# Patient Record
Sex: MALE | Race: WHITE | NOT HISPANIC OR LATINO | Employment: UNEMPLOYED | URBAN - METROPOLITAN AREA
[De-identification: names, ages, dates, MRNs, and addresses within clinical notes are randomized per-mention and may not be internally consistent; named-entity substitution may affect disease eponyms.]

---
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
## 2024-03-27 ENCOUNTER — OFFICE VISIT (OUTPATIENT)
Dept: FAMILY MEDICINE CLINIC | Facility: MEDICAL CENTER | Age: 19
End: 2024-03-27
Payer: COMMERCIAL

## 2024-03-27 VITALS
HEIGHT: 74 IN | BODY MASS INDEX: 20.56 KG/M2 | WEIGHT: 160.2 LBS | SYSTOLIC BLOOD PRESSURE: 122 MMHG | DIASTOLIC BLOOD PRESSURE: 76 MMHG | TEMPERATURE: 99.4 F | HEART RATE: 82 BPM | OXYGEN SATURATION: 97 %

## 2024-03-27 DIAGNOSIS — Z13.220 LIPID SCREENING: ICD-10-CM

## 2024-03-27 DIAGNOSIS — Z23 ENCOUNTER FOR IMMUNIZATION: ICD-10-CM

## 2024-03-27 DIAGNOSIS — B35.3 TINEA PEDIS OF BOTH FEET: ICD-10-CM

## 2024-03-27 DIAGNOSIS — Z00.00 ANNUAL PHYSICAL EXAM: Primary | ICD-10-CM

## 2024-03-27 DIAGNOSIS — H61.23 EXCESSIVE CERUMEN IN BOTH EAR CANALS: ICD-10-CM

## 2024-03-27 PROBLEM — M25.372 ANKLE INSTABILITY, LEFT: Status: RESOLVED | Noted: 2022-03-11 | Resolved: 2024-03-27

## 2024-03-27 PROBLEM — T14.8XXA CONTUSION OF BONE: Status: RESOLVED | Noted: 2022-03-08 | Resolved: 2024-03-27

## 2024-03-27 PROBLEM — S93.492A SPRAIN OF ANTERIOR TALOFIBULAR LIGAMENT OF LEFT ANKLE: Status: RESOLVED | Noted: 2022-03-08 | Resolved: 2024-03-27

## 2024-03-27 PROBLEM — S93.422A SPRAIN OF DELTOID LIGAMENT OF LEFT ANKLE: Status: RESOLVED | Noted: 2022-03-08 | Resolved: 2024-03-27

## 2024-03-27 PROBLEM — S96.912D ANKLE STRAIN, LEFT, SUBSEQUENT ENCOUNTER: Status: RESOLVED | Noted: 2022-03-11 | Resolved: 2024-03-27

## 2024-03-27 PROBLEM — S93.402A SEVERE SPRAIN OF LEFT ANKLE: Status: RESOLVED | Noted: 2022-03-11 | Resolved: 2024-03-27

## 2024-03-27 PROBLEM — S93.402A MODERATE LEFT ANKLE SPRAIN: Status: RESOLVED | Noted: 2022-03-08 | Resolved: 2024-03-27

## 2024-03-27 PROCEDURE — 90472 IMMUNIZATION ADMIN EACH ADD: CPT

## 2024-03-27 PROCEDURE — 99213 OFFICE O/P EST LOW 20 MIN: CPT

## 2024-03-27 PROCEDURE — 90471 IMMUNIZATION ADMIN: CPT

## 2024-03-27 PROCEDURE — 90651 9VHPV VACCINE 2/3 DOSE IM: CPT

## 2024-03-27 PROCEDURE — 99395 PREV VISIT EST AGE 18-39: CPT

## 2024-03-27 PROCEDURE — 90715 TDAP VACCINE 7 YRS/> IM: CPT

## 2024-03-27 RX ORDER — OFLOXACIN 3 MG/ML
1 SOLUTION/ DROPS OPHTHALMIC 4 TIMES DAILY
COMMUNITY
Start: 2024-03-22

## 2024-03-27 NOTE — PROGRESS NOTES
ADULT ANNUAL PHYSICAL  [facility] [place]    NAME: Stewart Mar  AGE: 18 y.o. SEX: male  : 2005     DATE: 3/27/2024     Assessment and Plan:   Advised to start Lotrimin cream OTC to bilateral feet for athlete's foot, use x 2 to 4 weeks twice daily.  Also advised to keep feet clean and dry, open to air when possible.  Advised to call if symptoms worsen or fail to improve.  Advised about use of Debrox eardrops OTC for  ear cerumen, avoid use of Q-tips.  Tdap vaccine administered today.  HPV #1 vaccine administered today.  Patient will return with nurse for HPV #2 and #3 vaccines.  Return in 1 year for annual physical, sooner if needed.  Fasting lab orders placed, to be done earliest convenience.  Problem List Items Addressed This Visit    None  Visit Diagnoses     Annual physical exam    -  Primary    Relevant Orders    Comprehensive metabolic panel    Encounter for immunization        Relevant Orders    TDAP VACCINE GREATER THAN OR EQUAL TO 8YO IM    HPV VACCINE 9 VALENT IM    Lipid screening        Relevant Orders    Lipid panel    Tinea pedis of both feet        Excessive cerumen in both ear canals              Immunizations and preventive care screenings were discussed with patient today. Appropriate education was printed on patient's after visit summary.    Counseling:  Alcohol/drug use: discussed moderation in alcohol intake, the recommendations for healthy alcohol use, and avoidance of illicit drug use.  Dental Health: discussed importance of regular tooth brushing, flossing, and dental visits.  Exercise: the importance of regular exercise/physical activity was discussed. Recommend exercise 3-5 times per week for at least 30 minutes.       Depression Screening and Follow-up Plan: Patient was screened for depression during today's encounter. They screened negative with a PHQ-9 score of 0.        Return in 1 year (on 3/27/2025).     Chief  Complaint:     Chief Complaint   Patient presents with   • Physical Exam      History of Present Illness:     Adult Annual Physical   Patient here for a comprehensive physical exam. The patient reports red areas on b/l feet specifically on the toes x 2 months, was itchy but is no longer itchy. Denies pain, drainage, peeling skin. He tried Tinactin and Lamisil which gave him some relief but did not clear it. He is still using it once per day.       Diet and Physical Activity  Diet/Nutrition: well balanced diet.   Exercise: walking and lifting weights .      Depression Screening  PHQ-2/9 Depression Screening    Little interest or pleasure in doing things: 0 - not at all  Feeling down, depressed, or hopeless: 0 - not at all  Trouble falling or staying asleep, or sleeping too much: 0 - not at all  Feeling tired or having little energy: 0 - not at all  Poor appetite or overeatin - not at all  Feeling bad about yourself - or that you are a failure or have let yourself or your family down: 0 - not at all  Trouble concentrating on things, such as reading the newspaper or watching television: 0 - not at all  Moving or speaking so slowly that other people could have noticed. Or the opposite - being so fidgety or restless that you have been moving around a lot more than usual: 0 - not at all  Thoughts that you would be better off dead, or of hurting yourself in some way: 0 - not at all  PHQ-9 Score: 0  PHQ-9 Interpretation: No or Minimal depression       General Health  Sleep: sleeps well.   Hearing: normal - bilateral.  Vision: no vision problems.   Dental: regular dental visits.        Health  History of STDs?: no.       Review of Systems:     Review of Systems   Constitutional: Negative.    HENT: Negative.     Eyes: Negative.    Respiratory: Negative.     Cardiovascular: Negative.    Gastrointestinal: Negative.    Endocrine: Negative.    Genitourinary: Negative.    Musculoskeletal: Negative.    Skin:         B/l foot  redness and itchiness   Allergic/Immunologic: Negative.    Neurological: Negative.    Hematological: Negative.    Psychiatric/Behavioral: Negative.        Past Medical History:     Past Medical History:   Diagnosis Date   • Ankle instability, left 03/11/2022   • Ankle strain, left, subsequent encounter 03/11/2022   • Anxiety    • Contusion of bone 03/08/2022   • Irritable bowel syndrome with diarrhea     Last assessed 1/7/2015   • Moderate left ankle sprain 03/08/2022   • Severe sprain of left ankle 03/11/2022   • Sprain of anterior talofibular ligament of left ankle 03/08/2022   • Sprain of deltoid ligament of left ankle 03/08/2022      Past Surgical History:     Past Surgical History:   Procedure Laterality Date   • NO PAST SURGERIES        Social History:     Social History     Socioeconomic History   • Marital status: Single     Spouse name: None   • Number of children: None   • Years of education: None   • Highest education level: None   Occupational History   • None   Tobacco Use   • Smoking status: Never   • Smokeless tobacco: Never   Vaping Use   • Vaping status: Never Used   Substance and Sexual Activity   • Alcohol use: No   • Drug use: Never   • Sexual activity: None   Other Topics Concern   • None   Social History Narrative   • None     Social Determinants of Health     Financial Resource Strain: Not on file   Food Insecurity: Not on file   Transportation Needs: Not on file   Physical Activity: Not on file   Stress: Not on file   Social Connections: Not on file   Intimate Partner Violence: Not on file   Housing Stability: Not on file      Family History:     Family History   Problem Relation Age of Onset   • Hypertension Father    • Diabetes Family       Current Medications:     Current Outpatient Medications   Medication Sig Dispense Refill   • fluticasone (FLONASE) 50 mcg/act nasal spray 2 sprays into each nostril daily 1 Bottle 3   • Multiple Vitamin (MULTIVITAMIN) tablet Take 1 tablet by mouth daily   "   • ofloxacin (OCUFLOX) 0.3 % ophthalmic solution Administer 1 drop into the left eye 4 (four) times a day       No current facility-administered medications for this visit.      Allergies:     Allergies   Allergen Reactions   • Bactrim [Sulfamethoxazole-Trimethoprim] Hives   • Azithromycin      Stomach aches   • Penicillins Hives      Physical Exam:     /76 (BP Location: Left arm, Patient Position: Sitting, Cuff Size: Standard)   Pulse 82   Temp 99.4 °F (37.4 °C) (Temporal)   Ht 6' 2\" (1.88 m)   Wt 72.7 kg (160 lb 3.2 oz)   SpO2 97%   BMI 20.57 kg/m²     Physical Exam  Vitals and nursing note reviewed.   Constitutional:       General: He is not in acute distress.     Appearance: Normal appearance. He is not ill-appearing.   HENT:      Head: Normocephalic and atraumatic.      Right Ear: There is impacted cerumen.      Left Ear: There is impacted cerumen.      Nose: Nose normal.      Mouth/Throat:      Mouth: Mucous membranes are moist.      Pharynx: Oropharynx is clear.   Eyes:      General:         Right eye: No discharge.         Left eye: No discharge.      Conjunctiva/sclera: Conjunctivae normal.      Pupils: Pupils are equal, round, and reactive to light.   Cardiovascular:      Rate and Rhythm: Normal rate and regular rhythm.      Pulses: Normal pulses.      Heart sounds: Normal heart sounds.   Pulmonary:      Effort: Pulmonary effort is normal.      Breath sounds: Normal breath sounds.   Abdominal:      General: Abdomen is flat. Bowel sounds are normal.      Palpations: Abdomen is soft.      Tenderness: There is no abdominal tenderness.   Musculoskeletal:         General: Normal range of motion.      Cervical back: Normal range of motion and neck supple.   Feet:      Right foot:      Skin integrity: Skin integrity normal.      Toenail Condition: Right toenails are normal.      Left foot:      Skin integrity: Skin integrity normal.      Toenail Condition: Left toenails are normal.      Comments: " Small red blisters noted to b/l toes. No active drainage, surrounding redness. No blisters on feet.   Skin:     General: Skin is warm and dry.   Neurological:      General: No focal deficit present.      Mental Status: He is alert and oriented to person, place, and time. Mental status is at baseline.   Psychiatric:         Mood and Affect: Mood normal.         Behavior: Behavior normal.         Thought Content: Thought content normal.          ABIMBOLA Rollins   [facility]

## 2024-11-19 NOTE — PROGRESS NOTES
Ear cerumen removal    Date/Time: 2024 3:40 PM    Performed by: Francoise London PA-C  Authorized by: Francoise London PA-C  Universal Protocol:  procedure performed by consultantConsent: Verbal consent obtained.  Consent given by: patient  Patient understanding: patient states understanding of the procedure being performed  Patient consent: the patient's understanding of the procedure matches consent given    Patient location:  Bedside  Procedure details:     Location:  R ear    Procedure type: irrigation with instrumentation      Instrumentation: curette      Approach:  External  Post-procedure details:     Complication:  None    Hearing quality:  Improved    Patient tolerance of procedure:  Tolerated well, no immediate complications    Name: Stewart Mar      : 2005      MRN: [id number]  Encounter Provider: Francoise London PA-C  Encounter Date: 2024   Encounter department: [facility] [address] [place] [address]  :  Assessment & Plan  Encounter to establish care  -Est care   -Concerns include anxiety/depression and feeling right ear clogged   -Placed routine labs     Orders:    CBC and differential; Future    Comprehensive metabolic panel; Future    Encounter for immunization  -Due for second dose of HPV vaccine    Orders:    HPV VACCINE 9 VALENT IM    Anxiety and depression  -He states he has had anxiety since he was very young. He notes social settings tend to peak anxiety   -No active suicide ideations  -He has seen a therapist in past and it was somewhat helpful   -Ordered Lexapro 5 mg daily   -Placed referral to psych services and gave handout for behavioral services   -F/U in one month     Depression Screening Follow-up Plan: Patient's depression screening was positive with a PHQ-9 score of 8. Patient with underlying depression and was advised to continue current medications as prescribed. Patient assessed for underlying major depression. They have no active suicidal  ideations. Brief counseling provided and recommend additional follow-up/re-evaluation next office visit.  -  Orders:    Ambulatory referral to Psych Services; Future    escitalopram (LEXAPRO) 5 mg tablet; Take 1 tablet (5 mg total) by mouth daily    Need for hepatitis C screening test    Orders:    Hepatitis C Antibody; Future    Screening for HIV (human immunodeficiency virus)    Orders:    HIV 1/2 AG/AB w Reflex [facility] for 2 yr old and above; Future    Encounter for lipid screening for cardiovascular disease    Orders:    Lipid panel; Future    Impacted cerumen of right ear    Orders:    Ear cerumen removal          Depression Screening and Follow-up Plan: Patient with underlying depression and was advised to continue current medications as prescribed.       History of Present Illness     HPI    Stewart presents to office to establish care and discuss ear pain and anxiety. His right ear has been feeling clogged. His right TM perf in past and healed. It has been feeling clogged for at least 6 months.     Review of Systems   Constitutional:  Negative for chills, fatigue and fever.   HENT:  Positive for hearing loss. Negative for congestion, ear pain, rhinorrhea, sinus pain and sore throat.    Eyes:  Negative for pain.   Respiratory:  Negative for cough and shortness of breath.    Cardiovascular:  Negative for chest pain and leg swelling.   Gastrointestinal:  Negative for abdominal pain, constipation, diarrhea, nausea and vomiting.   Genitourinary:  Negative for difficulty urinating, dysuria, frequency and urgency.   Musculoskeletal:  Negative for neck pain.   Skin:  Negative for rash.   Neurological:  Negative for dizziness and headaches.   Psychiatric/Behavioral:  Negative for sleep disturbance.      Past Medical History   Past Medical History:   Diagnosis Date    Ankle instability, left 03/11/2022    Ankle strain, left, subsequent encounter 03/11/2022    Anxiety     Contusion of bone 03/08/2022    Irritable bowel  "syndrome with diarrhea     Last assessed 1/7/2015    Moderate left ankle sprain 03/08/2022    Severe sprain of left ankle 03/11/2022    Sprain of anterior talofibular ligament of left ankle 03/08/2022    Sprain of deltoid ligament of left ankle 03/08/2022     Past Surgical History:   Procedure Laterality Date    NO PAST SURGERIES       Family History   Problem Relation Age of Onset    Hypertension Father     Depression Father     Asthma Father     Anxiety disorder Father     Diabetes Family     Dementia Paternal Grandfather       reports that he has never smoked. He has never used smokeless tobacco. He reports that he does not drink alcohol and does not use drugs.  Current Outpatient Medications on File Prior to Visit   Medication Sig Dispense Refill    fluticasone (FLONASE) 50 mcg/act nasal spray 2 sprays into each nostril daily 1 Bottle 3    Multiple Vitamin (MULTIVITAMIN) tablet Take 1 tablet by mouth daily       No current facility-administered medications on file prior to visit.     Allergies   Allergen Reactions    Bactrim [Sulfamethoxazole-Trimethoprim] Hives    Azithromycin      Stomach aches    Penicillins Hives           Objective   /62   Pulse 86   Temp 99.7 °F (37.6 °C)   Ht 6' 2\" (1.88 m)   Wt 74.8 kg (165 lb)   SpO2 99%   BMI 21.18 kg/m²      Physical Exam  Vitals reviewed.   Constitutional:       General: He is not in acute distress.     Appearance: Normal appearance.   HENT:      Head: Normocephalic and atraumatic.      Right Ear: External ear normal. There is impacted cerumen.      Left Ear: Tympanic membrane, ear canal and external ear normal.      Nose: Nose normal.      Mouth/Throat:      Mouth: Mucous membranes are moist.   Eyes:      Extraocular Movements: Extraocular movements intact.      Conjunctiva/sclera: Conjunctivae normal.      Pupils: Pupils are equal, round, and reactive to light.   Cardiovascular:      Rate and Rhythm: Normal rate and regular rhythm.      Heart sounds: " Normal heart sounds.   Pulmonary:      Effort: Pulmonary effort is normal.      Breath sounds: Normal breath sounds. No wheezing, rhonchi or rales.   Abdominal:      General: Bowel sounds are normal. There is no distension.      Palpations: Abdomen is soft.      Tenderness: There is no abdominal tenderness.   Musculoskeletal:      Cervical back: Neck supple.      Right lower leg: No edema.      Left lower leg: No edema.   Lymphadenopathy:      Cervical: No cervical adenopathy.   Skin:     General: Skin is warm.      Capillary Refill: Capillary refill takes less than 2 seconds.      Findings: No rash.   Neurological:      Mental Status: He is alert. Mental status is at baseline.           Francoise London PA-C  [facility]  11/21/2024 8:40 AM

## 2024-11-20 ENCOUNTER — OFFICE VISIT (OUTPATIENT)
Dept: FAMILY MEDICINE CLINIC | Facility: CLINIC | Age: 19
End: 2024-11-20
Payer: COMMERCIAL

## 2024-11-20 ENCOUNTER — TELEPHONE (OUTPATIENT)
Age: 19
End: 2024-11-20

## 2024-11-20 VITALS
SYSTOLIC BLOOD PRESSURE: 122 MMHG | TEMPERATURE: 99.7 F | DIASTOLIC BLOOD PRESSURE: 62 MMHG | HEIGHT: 74 IN | OXYGEN SATURATION: 99 % | WEIGHT: 165 LBS | BODY MASS INDEX: 21.17 KG/M2 | HEART RATE: 86 BPM

## 2024-11-20 DIAGNOSIS — Z23 ENCOUNTER FOR IMMUNIZATION: ICD-10-CM

## 2024-11-20 DIAGNOSIS — Z76.89 ENCOUNTER TO ESTABLISH CARE: Primary | ICD-10-CM

## 2024-11-20 DIAGNOSIS — Z13.6 ENCOUNTER FOR LIPID SCREENING FOR CARDIOVASCULAR DISEASE: ICD-10-CM

## 2024-11-20 DIAGNOSIS — F41.9 ANXIETY AND DEPRESSION: ICD-10-CM

## 2024-11-20 DIAGNOSIS — Z11.4 SCREENING FOR HIV (HUMAN IMMUNODEFICIENCY VIRUS): ICD-10-CM

## 2024-11-20 DIAGNOSIS — Z11.59 NEED FOR HEPATITIS C SCREENING TEST: ICD-10-CM

## 2024-11-20 DIAGNOSIS — H61.21 IMPACTED CERUMEN OF RIGHT EAR: ICD-10-CM

## 2024-11-20 DIAGNOSIS — F32.A ANXIETY AND DEPRESSION: ICD-10-CM

## 2024-11-20 DIAGNOSIS — Z13.220 ENCOUNTER FOR LIPID SCREENING FOR CARDIOVASCULAR DISEASE: ICD-10-CM

## 2024-11-20 PROCEDURE — 69210 REMOVE IMPACTED EAR WAX UNI: CPT

## 2024-11-20 PROCEDURE — 90651 9VHPV VACCINE 2/3 DOSE IM: CPT

## 2024-11-20 PROCEDURE — 90471 IMMUNIZATION ADMIN: CPT

## 2024-11-20 PROCEDURE — 99214 OFFICE O/P EST MOD 30 MIN: CPT

## 2024-11-20 RX ORDER — ESCITALOPRAM OXALATE 5 MG/1
5 TABLET ORAL DAILY
Qty: 30 TABLET | Refills: 5 | Status: SHIPPED | OUTPATIENT
Start: 2024-11-20

## 2024-11-20 NOTE — TELEPHONE ENCOUNTER
Contacted patient in regards to Routine Referral in attempts to verify patient's needs of services and add patient to proper wait list. LVM for patient to contact intake dept  in regards to routine referral at [phone].  1st attempt.

## 2024-11-25 NOTE — TELEPHONE ENCOUNTER
Called Pt regarding a routine referral, in an attempt to verify services needed/interested, and advise of current wait list.  No answer, lvm for patient to call back at [phone].    2nd attempt

## 2024-12-20 ENCOUNTER — TELEPHONE (OUTPATIENT)
Age: 19
End: 2024-12-20

## 2024-12-20 NOTE — PROGRESS NOTES
Name: Stewart Mar      : 2005      MRN: [id number]  Encounter Provider: Francoise London PA-C  Encounter Date: 2024   Encounter department: [facility] [address]  :  Assessment & Plan  Mild depression  Depression Screening Follow-up Plan: Patient's depression screening was positive with a PHQ-9 score of 5. Patient assessed for underlying major depression. They have no active suicidal ideations. Brief counseling provided and recommend additional follow-up/re-evaluation next office visit. Feeling good on Lexapro. Waiting to hear from talk therapy.     -Tolerating Lexapro well   -Cont taking Lexapro 5 mg daily  -Waiting to hear from talk therapy  -Overall, anxiety and depression is improving since starting Lexapro   -       Eczema, unspecified type  -eczematous rash on b/l froearms  -PT reports he gets this every Winter when the temperature drops  -He usually uses otc hydrocortisone for symptoms. He is going to pick some up and start today  -Discussed if symptoms aren't resolved, options for stronger topical corticosteroids are available             Depression Screening and Follow-up Plan: Patient's depression screening was positive with a PHQ-9 score of 5. Patient with underlying depression and was advised to continue current medications as prescribed. Feeling good and tolerating lexapro well. Waiting to hear from talk therapy. F/U in 2 months      History of Present Illness     HPI    Stewart presents to the office for mental health follow up. He was started on lexapro 5 mg one month ago. He notes it is helping. He notes the end of the semester was stressful but he managed okay.       Review of Systems   Constitutional:  Negative for chills, fatigue and fever.   HENT:  Negative for congestion, ear pain, rhinorrhea, sinus pain and sore throat.    Eyes:  Negative for pain.   Respiratory:  Negative for cough and shortness of breath.    Cardiovascular:  Negative for  chest pain and leg swelling.   Gastrointestinal:  Negative for abdominal pain, constipation, diarrhea, nausea and vomiting.   Genitourinary:  Negative for difficulty urinating, dysuria, frequency and urgency.   Musculoskeletal:  Negative for neck pain.   Skin:  Negative for rash.   Neurological:  Negative for dizziness and headaches.   Psychiatric/Behavioral:  Negative for sleep disturbance.        Objective   There were no vitals taken for this visit.     Physical Exam  Vitals reviewed.   Constitutional:       General: He is not in acute distress.     Appearance: Normal appearance.   HENT:      Head: Normocephalic and atraumatic.      Right Ear: External ear normal.      Left Ear: External ear normal.      Nose: Nose normal.      Mouth/Throat:      Mouth: Mucous membranes are moist.   Eyes:      Extraocular Movements: Extraocular movements intact.      Conjunctiva/sclera: Conjunctivae normal.   Cardiovascular:      Rate and Rhythm: Normal rate and regular rhythm.      Heart sounds: Normal heart sounds.   Pulmonary:      Effort: Pulmonary effort is normal.      Breath sounds: Normal breath sounds. No wheezing, rhonchi or rales.   Abdominal:      General: Bowel sounds are normal. There is no distension.      Palpations: Abdomen is soft.      Tenderness: There is no abdominal tenderness.   Musculoskeletal:      Cervical back: Neck supple.      Right lower leg: No edema.      Left lower leg: No edema.   Lymphadenopathy:      Cervical: No cervical adenopathy.   Skin:     General: Skin is warm.      Capillary Refill: Capillary refill takes less than 2 seconds.      Findings: Rash present.      Comments: Eczematous rash on b/l forearm    Neurological:      Mental Status: He is alert. Mental status is at baseline.           Francoise London PA-C  [facility]  12/23/2024 2:13 PM

## 2024-12-20 NOTE — TELEPHONE ENCOUNTER
Patient was going to get labs done, but they noticed that they were linked to [facility]. Patients insurance only covers [facility], so patients mom wanted to know if new lab orders can be put in, so he can get them done @ [facility]. Please advise.      Lv Chu (Mother)  [phone] (Home Phone)

## 2024-12-23 ENCOUNTER — OFFICE VISIT (OUTPATIENT)
Dept: FAMILY MEDICINE CLINIC | Facility: CLINIC | Age: 19
End: 2024-12-23
Payer: COMMERCIAL

## 2024-12-23 VITALS
DIASTOLIC BLOOD PRESSURE: 70 MMHG | BODY MASS INDEX: 20.79 KG/M2 | WEIGHT: 162 LBS | OXYGEN SATURATION: 97 % | HEIGHT: 74 IN | TEMPERATURE: 98.5 F | HEART RATE: 93 BPM | SYSTOLIC BLOOD PRESSURE: 116 MMHG

## 2024-12-23 DIAGNOSIS — F32.A MILD DEPRESSION: Primary | ICD-10-CM

## 2024-12-23 DIAGNOSIS — L30.9 ECZEMA, UNSPECIFIED TYPE: ICD-10-CM

## 2024-12-23 PROCEDURE — 99214 OFFICE O/P EST MOD 30 MIN: CPT

## 2024-12-23 NOTE — ASSESSMENT & PLAN NOTE
Depression Screening Follow-up Plan: Patient's depression screening was positive with a PHQ-9 score of 5. Patient assessed for underlying major depression. They have no active suicidal ideations. Brief counseling provided and recommend additional follow-up/re-evaluation next office visit. Feeling good on Lexapro. Waiting to hear from talk therapy.     -Tolerating Lexapro well   -Cont taking Lexapro 5 mg daily  -Waiting to hear from talk therapy  -Overall, anxiety and depression is improving since starting Lexapro   -

## 2025-02-25 ENCOUNTER — OFFICE VISIT (OUTPATIENT)
Dept: FAMILY MEDICINE CLINIC | Facility: CLINIC | Age: 20
End: 2025-02-25
Payer: COMMERCIAL

## 2025-02-25 VITALS
HEART RATE: 87 BPM | HEIGHT: 74 IN | OXYGEN SATURATION: 97 % | SYSTOLIC BLOOD PRESSURE: 116 MMHG | WEIGHT: 163 LBS | TEMPERATURE: 98.6 F | BODY MASS INDEX: 20.92 KG/M2 | DIASTOLIC BLOOD PRESSURE: 72 MMHG

## 2025-02-25 DIAGNOSIS — F41.8 ANXIETY WITH DEPRESSION: Primary | ICD-10-CM

## 2025-02-25 DIAGNOSIS — G43.009 MIGRAINE WITHOUT AURA AND WITHOUT STATUS MIGRAINOSUS, NOT INTRACTABLE: ICD-10-CM

## 2025-02-25 DIAGNOSIS — L30.9 ECZEMA, UNSPECIFIED TYPE: ICD-10-CM

## 2025-02-25 PROCEDURE — 99214 OFFICE O/P EST MOD 30 MIN: CPT

## 2025-02-25 RX ORDER — ESCITALOPRAM OXALATE 10 MG/1
10 TABLET ORAL DAILY
Qty: 30 TABLET | Refills: 3 | Status: SHIPPED | OUTPATIENT
Start: 2025-02-25

## 2025-02-25 RX ORDER — TRIAMCINOLONE ACETONIDE 1 MG/G
CREAM TOPICAL 2 TIMES DAILY
Qty: 15 G | Refills: 3 | Status: SHIPPED | OUTPATIENT
Start: 2025-02-25

## 2025-02-25 NOTE — PROGRESS NOTES
Name: Stewart Mar      : 2005      MRN: [id number]  Encounter Provider: Francoise London PA-C  Encounter Date: 2025   Encounter department: [place] [id number]  :  Assessment & Plan  Anxiety with depression  Depression Screening Follow-up Plan: Patient's depression screening was positive with a PHQ-9 score of 7. Patient assessed for underlying major depression. They have no active suicidal ideations. Brief counseling provided and recommend additional follow-up/re-evaluation next office visit.    -PT on Lexapro 5 mg since 2024, was feeling relief from anxiety in December but recently notes he feels the effects are not as much  -He feels overall good with lexapro and does not have side effects  -He noticed he's been getting 1-2 headaches a week that resolves with ibuprofen  -Denies thoughts of hurting himself or anyone else  -Discussed increasing dose of lexapro to 10mg and f/u 2 months  -RTO if symptoms are not improving in meantime     Orders:    escitalopram (LEXAPRO) 10 mg tablet; Take 1 tablet (10 mg total) by mouth daily    Eczema, unspecified type  -PT has eczema on b/l forearms  -Has been using OTC hydrocortisone and moisturizer without complete relief  -Ordered kenalog 0.1% cream     Orders:    triamcinolone (KENALOG) 0.1 % cream; Apply topically 2 (two) times a day    Migraine without aura and without status migrainosus, not intractable  -PT notes localized headaches 1-2x a week   -Denies visual, auditory, sensory disturbances, n/v, ha that wakes him from sleep  -Discussed monitoring HA and triggers  -He notes symptoms resolve with ibuprofen. Discussed Sumatriptan as treatment option in future   -Discussed reducing screen time, stress, and staying hydrated               History of Present Illness     HPI    Stewart presents to the office for mental health follow up. He started Lexapro 5 mg 2024 and followed up 2024, he notes overall the  "anxiety and depression were improving.       Review of Systems   Constitutional:  Negative for chills, fatigue and fever.   HENT:  Negative for congestion, ear pain, rhinorrhea, sinus pain and sore throat.    Eyes:  Negative for pain.   Respiratory:  Negative for cough and shortness of breath.    Cardiovascular:  Negative for chest pain and leg swelling.   Gastrointestinal:  Negative for abdominal pain, constipation, diarrhea, nausea and vomiting.   Genitourinary:  Negative for difficulty urinating, dysuria, frequency and urgency.   Musculoskeletal:  Negative for neck pain.   Skin:  Negative for rash.   Neurological:  Negative for dizziness and headaches.   Psychiatric/Behavioral:  Negative for sleep disturbance.        Objective   /72 (Patient Position: Sitting, Cuff Size: Standard)   Pulse 87   Temp 98.6 °F (37 °C)   Ht 6' 2\" (1.88 m)   Wt 73.9 kg (163 lb)   SpO2 97%   BMI 20.93 kg/m²      Physical Exam  Vitals reviewed.   Constitutional:       General: He is not in acute distress.     Appearance: Normal appearance.   HENT:      Head: Normocephalic and atraumatic.      Right Ear: External ear normal.      Left Ear: External ear normal.      Nose: Nose normal.      Mouth/Throat:      Mouth: Mucous membranes are moist.   Eyes:      Extraocular Movements: Extraocular movements intact.      Conjunctiva/sclera: Conjunctivae normal.   Cardiovascular:      Rate and Rhythm: Normal rate and regular rhythm.      Heart sounds: Normal heart sounds.   Pulmonary:      Effort: Pulmonary effort is normal.      Breath sounds: Normal breath sounds. No wheezing, rhonchi or rales.   Abdominal:      General: Bowel sounds are normal. There is no distension.      Palpations: Abdomen is soft.      Tenderness: There is no abdominal tenderness.   Musculoskeletal:      Cervical back: Neck supple.      Right lower leg: No edema.      Left lower leg: No edema.   Lymphadenopathy:      Cervical: No cervical adenopathy.   Skin:     " General: Skin is warm.      Capillary Refill: Capillary refill takes less than 2 seconds.      Findings: No rash.   Neurological:      Mental Status: He is alert. Mental status is at baseline.           Francoise London PA-C  [facility]  2/25/2025 3:09 PM

## 2025-04-25 ENCOUNTER — OFFICE VISIT (OUTPATIENT)
Dept: FAMILY MEDICINE CLINIC | Facility: CLINIC | Age: 20
End: 2025-04-25
Payer: COMMERCIAL

## 2025-04-25 VITALS
OXYGEN SATURATION: 99 % | DIASTOLIC BLOOD PRESSURE: 82 MMHG | BODY MASS INDEX: 21.82 KG/M2 | HEIGHT: 74 IN | HEART RATE: 79 BPM | SYSTOLIC BLOOD PRESSURE: 132 MMHG | WEIGHT: 170 LBS | TEMPERATURE: 100 F

## 2025-04-25 DIAGNOSIS — L30.9 ECZEMA, UNSPECIFIED TYPE: ICD-10-CM

## 2025-04-25 DIAGNOSIS — B35.3 ATHLETE'S FOOT, LEFT: ICD-10-CM

## 2025-04-25 DIAGNOSIS — F41.8 ANXIETY WITH DEPRESSION: Primary | ICD-10-CM

## 2025-04-25 PROCEDURE — 99214 OFFICE O/P EST MOD 30 MIN: CPT

## 2025-04-25 RX ORDER — ESCITALOPRAM OXALATE 10 MG/1
10 TABLET ORAL DAILY
Qty: 30 TABLET | Refills: 3 | Status: SHIPPED | OUTPATIENT
Start: 2025-04-25

## 2025-04-25 RX ORDER — CLOTRIMAZOLE 1 %
CREAM (GRAM) TOPICAL 2 TIMES DAILY
Qty: 14 G | Refills: 1 | Status: SHIPPED | OUTPATIENT
Start: 2025-04-25

## 2025-04-25 NOTE — PROGRESS NOTES
Name: Stewart Mar      : 2005      MRN: [id number]  Encounter Provider: Francoise London PA-C  Encounter Date: 2025   Encounter department: [facility] [address]  :  Assessment & Plan  Anxiety with depression  -He has been feeling well with Lexapro 10 mg daily  -denies any side effects from medication  -PHQ negative today in office  -Denies thoughts of hurting himself or anyone else  -Declines referral to talk therapy at this time   BISHNU-7 Flowsheet Screening      Flowsheet Row Most Recent Value   Over the last two weeks, how often have you been bothered by the following problems?     Feeling nervous, anxious, or on edge 1   Not being able to stop or control worrying 0   Worrying too much about different things 1   Trouble relaxing  1   Being so restless that it's hard to sit still 0   Becoming easily annoyed or irritable  0   Feeling afraid as if something awful might happen 0   How difficult have these problems made it for you to do your work, take care of things at home, or get along with other people?  Somewhat difficult   BISHNU Score  3          -Refill sent    Orders:    escitalopram (LEXAPRO) 10 mg tablet; Take 1 tablet (10 mg total) by mouth daily    Eczema, unspecified type  -Eczema improving with Kenalog cream and the weather warming up       Athlete's foot, left  -Pt notes he has had red fungal rash on left foot around his toes  -Notes it comes and goes. Uses otc antifungal creams with some relief  -on exam, left middle toe with erythematous rash, no skin maceration or rash bw toes, no tenderness, warmth to touch  -Ordering Lotrimin 1% cream, discussed foot hygiene, if sx continue can consider podiatry referral   Orders:    clotrimazole (LOTRIMIN) 1 % cream; Apply topically 2 (two) times a day           History of Present Illness   HPI    Stewart presents to the office for evaluation of mental health follow up. He started Lexapro 5 mg 2024. Dose was  "increased to 10 mg 2/25/2025.       Notes rash on left foot. Using topical cream for athlete's foot .    He notes he is still getting headaches. Notes the headaches are usually after school on his long days and he believes it is from screen time. His semester will end in about 2 weeks and we will see if HA improves.     Review of Systems   Constitutional:  Negative for chills, fatigue and fever.   HENT:  Negative for congestion, ear pain, rhinorrhea, sinus pain and sore throat.    Eyes:  Negative for pain.   Respiratory:  Negative for cough and shortness of breath.    Cardiovascular:  Negative for chest pain and leg swelling.   Gastrointestinal:  Negative for abdominal pain, constipation, diarrhea, nausea and vomiting.   Genitourinary:  Negative for difficulty urinating, dysuria, frequency and urgency.   Musculoskeletal:  Negative for neck pain.   Skin:  Negative for rash.   Neurological:  Negative for dizziness and headaches.   Psychiatric/Behavioral:  Negative for sleep disturbance.        Objective   /82   Pulse 79   Temp 100 °F (37.8 °C)   Ht 6' 2\" (1.88 m)   Wt 77.1 kg (170 lb)   SpO2 99%   BMI 21.83 kg/m²      Physical Exam  Vitals reviewed.   Constitutional:       General: He is not in acute distress.     Appearance: Normal appearance.   HENT:      Head: Normocephalic and atraumatic.      Right Ear: External ear normal.      Left Ear: External ear normal.      Nose: Nose normal.      Mouth/Throat:      Mouth: Mucous membranes are moist.   Eyes:      Extraocular Movements: Extraocular movements intact.      Conjunctiva/sclera: Conjunctivae normal.   Cardiovascular:      Rate and Rhythm: Normal rate and regular rhythm.      Heart sounds: Normal heart sounds.   Pulmonary:      Effort: Pulmonary effort is normal.      Breath sounds: Normal breath sounds. No wheezing, rhonchi or rales.   Abdominal:      General: Bowel sounds are normal. There is no distension.      Palpations: Abdomen is soft.      " Tenderness: There is no abdominal tenderness. There is no right CVA tenderness or left CVA tenderness.   Musculoskeletal:      Cervical back: Neck supple.      Right lower leg: No edema.      Left lower leg: No edema.   Lymphadenopathy:      Cervical: No cervical adenopathy.   Skin:     General: Skin is warm.      Capillary Refill: Capillary refill takes less than 2 seconds.      Findings: Rash present.          Neurological:      Mental Status: He is alert. Mental status is at baseline.           Francoise London PA-C  [facility]  4/25/2025 4:03 PM

## 2025-05-27 ENCOUNTER — OFFICE VISIT (OUTPATIENT)
Dept: FAMILY MEDICINE CLINIC | Facility: CLINIC | Age: 20
End: 2025-05-27
Payer: COMMERCIAL

## 2025-05-27 ENCOUNTER — TELEPHONE (OUTPATIENT)
Age: 20
End: 2025-05-27

## 2025-05-27 VITALS
HEART RATE: 106 BPM | HEIGHT: 74 IN | WEIGHT: 162 LBS | OXYGEN SATURATION: 98 % | BODY MASS INDEX: 20.79 KG/M2 | TEMPERATURE: 98.4 F | DIASTOLIC BLOOD PRESSURE: 82 MMHG | SYSTOLIC BLOOD PRESSURE: 124 MMHG

## 2025-05-27 DIAGNOSIS — R42 LIGHTHEADEDNESS: ICD-10-CM

## 2025-05-27 DIAGNOSIS — J06.9 UPPER RESPIRATORY TRACT INFECTION, UNSPECIFIED TYPE: Primary | ICD-10-CM

## 2025-05-27 DIAGNOSIS — J02.9 SORE THROAT: ICD-10-CM

## 2025-05-27 LAB — GLUCOSE SERPL-MCNC: 115 MG/DL (ref 65–140)

## 2025-05-27 PROCEDURE — 93000 ELECTROCARDIOGRAM COMPLETE: CPT

## 2025-05-27 PROCEDURE — 99214 OFFICE O/P EST MOD 30 MIN: CPT

## 2025-05-27 RX ORDER — DOXYCYCLINE 100 MG/1
100 CAPSULE ORAL EVERY 12 HOURS SCHEDULED
Qty: 14 CAPSULE | Refills: 0 | Status: SHIPPED | OUTPATIENT
Start: 2025-05-27 | End: 2025-06-03

## 2025-05-27 NOTE — PROGRESS NOTES
Name: Stewart Mar      : 2005      MRN: [id number]  Encounter Provider: Francoise London PA-C  Encounter Date: 2025   Encounter department: [facility] [address] [facility]  :  Assessment & Plan  Upper respiratory tract infection, unspecified type  -sore throat, worse with swallowing x 4 days.  -He notes a little congestion as well and PND. Symptoms came on quickly over the weekend. He is is having trouble sleeping with sore throat. He feels a little nauseous   -Ibuprofen and throat lozenges have not been too helpful   -Denies fever, cough,v/d, muffled voice, drooling  -On exam, throat erythematous, tonsils 1+ b/l w/o exudate, uvula midline. No cervical lymphadenopathy noted   -Pt with PCN allergy, will order doxycyline 100 mg BID x 7 days. Continue with warm teas, throat lozenges, tylenol if fever presents, flonase for congestion. Will also order mono panel to r/o     Orders:    doxycycline hyclate (VIBRAMYCIN) 100 mg capsule; Take 1 capsule (100 mg total) by mouth every 12 (twelve) hours for 7 days    Lightheadedness  -At the end of the visit, patient reports he did not feel well and was very warm.   -BP was 110/86 laying down, HR 98. Given cool compress and elevated legs.   -Denies chest pain, dizziness, SOB, palpitations. He is AO x 3 and did not LOC. Mom reports similar episode when he was younger getting ears cleaned. Notes he was feeling anxious/panic at the time. He is currently on Lexapro 10 mg daily  -EKG in office, VR 76, NSR, scanned into media. Will order ECHO and f/u with results   -POCT glucose 115. Pt had tea and throat lozenges in morning  -Pt felt better. Discussed ordering mono panel and ECHO with mom. They are agreeable. Discussed if episode happens again, he should report to ED   Orders:    POCT ECG    Fingerstick Glucose (POCT)    Echo complete w/ contrast if indicated; Future    Sore throat    Orders:    Mononucleosis screen; Future          "  History of Present Illness     Sore Throat   Associated symptoms include congestion. Pertinent negatives include no abdominal pain, coughing, diarrhea, ear pain, headaches, neck pain, shortness of breath or vomiting.       Stewart presents to the office with Mom for evaluation of sore throat, worse with swallowing x 4 days. He notes a little congestion as well and PND. Symptoms came on quickly over the weekend. He is tired and fatigued and not sleeping well. He feels a little nauseous     Ibuprofen and throat lozenges have not been too helpful     Denies fever, cough,v/d.   Repeat pulse 98    Review of Systems   Constitutional:  Negative for chills, fatigue and fever.   HENT:  Positive for congestion and sore throat. Negative for ear pain, rhinorrhea and sinus pain.    Eyes:  Negative for pain.   Respiratory:  Negative for cough, shortness of breath and wheezing.    Cardiovascular:  Negative for chest pain and leg swelling.   Gastrointestinal:  Negative for abdominal pain, constipation, diarrhea, nausea and vomiting.   Genitourinary:  Negative for difficulty urinating, dysuria, frequency and urgency.   Musculoskeletal:  Negative for neck pain.   Skin:  Negative for rash.   Neurological:  Negative for dizziness and headaches.   Psychiatric/Behavioral:  Negative for sleep disturbance.        Objective   /82   Pulse (!) 106   Temp 98.4 °F (36.9 °C)   Ht 6' 2\" (1.88 m)   Wt 73.5 kg (162 lb)   SpO2 98%   BMI 20.80 kg/m²      Physical Exam  Vitals reviewed.   Constitutional:       General: He is not in acute distress.     Appearance: Normal appearance.   HENT:      Head: Normocephalic and atraumatic.      Right Ear: Tympanic membrane, ear canal and external ear normal.      Left Ear: Tympanic membrane, ear canal and external ear normal.      Nose: Congestion present.      Right Sinus: No maxillary sinus tenderness or frontal sinus tenderness.      Left Sinus: No maxillary sinus tenderness or frontal sinus " tenderness.      Mouth/Throat:      Mouth: Mucous membranes are moist.      Pharynx: Uvula midline. Posterior oropharyngeal erythema present. No oropharyngeal exudate.      Tonsils: No tonsillar exudate. 1+ on the right. 1+ on the left.     Eyes:      Extraocular Movements: Extraocular movements intact.      Conjunctiva/sclera: Conjunctivae normal.      Pupils: Pupils are equal, round, and reactive to light.       Cardiovascular:      Rate and Rhythm: Normal rate and regular rhythm.      Heart sounds: Normal heart sounds.   Pulmonary:      Effort: Pulmonary effort is normal.      Breath sounds: Normal breath sounds. No wheezing, rhonchi or rales.   Abdominal:      General: Bowel sounds are normal. There is no distension.      Palpations: Abdomen is soft.      Tenderness: There is no abdominal tenderness. There is no right CVA tenderness or left CVA tenderness.     Musculoskeletal:      Cervical back: Neck supple.      Right lower leg: No edema.      Left lower leg: No edema.   Lymphadenopathy:      Cervical: No cervical adenopathy.     Skin:     General: Skin is warm.      Capillary Refill: Capillary refill takes less than 2 seconds.      Findings: No rash.     Neurological:      Mental Status: He is alert. Mental status is at baseline.           Francoise London PA-C  [facility]  5/27/2025 11:50 AM

## 2025-05-27 NOTE — TELEPHONE ENCOUNTER
Charlotte, with [facility], called in requesting for us to please fax them the order Francoise put in for pt of an echocardiogram.    Please fax to: [phone]

## 2025-06-09 ENCOUNTER — VBI (OUTPATIENT)
Dept: ADMINISTRATIVE | Facility: OTHER | Age: 20
End: 2025-06-09

## 2025-06-09 NOTE — TELEPHONE ENCOUNTER
06/09/25 10:16 AM     Chart reviewed for [facility] Visits was/were not submitted to the patient's insurance.     Sandy Adrian MA   [facility]

## 2025-06-11 ENCOUNTER — OFFICE VISIT (OUTPATIENT)
Dept: FAMILY MEDICINE CLINIC | Facility: CLINIC | Age: 20
End: 2025-06-11
Payer: COMMERCIAL

## 2025-06-11 VITALS
SYSTOLIC BLOOD PRESSURE: 126 MMHG | OXYGEN SATURATION: 98 % | TEMPERATURE: 98.2 F | DIASTOLIC BLOOD PRESSURE: 80 MMHG | HEART RATE: 102 BPM | HEIGHT: 74 IN | WEIGHT: 159.6 LBS | BODY MASS INDEX: 20.48 KG/M2

## 2025-06-11 DIAGNOSIS — Z00.00 ANNUAL PHYSICAL EXAM: Primary | ICD-10-CM

## 2025-06-11 DIAGNOSIS — F41.8 ANXIETY WITH DEPRESSION: ICD-10-CM

## 2025-06-11 DIAGNOSIS — L91.8 SKIN TAG: ICD-10-CM

## 2025-06-11 DIAGNOSIS — R42 LIGHTHEADEDNESS: ICD-10-CM

## 2025-06-11 DIAGNOSIS — B35.3 ATHLETE'S FOOT, LEFT: ICD-10-CM

## 2025-06-11 DIAGNOSIS — J01.40 ACUTE NON-RECURRENT PANSINUSITIS: ICD-10-CM

## 2025-06-11 PROCEDURE — 99214 OFFICE O/P EST MOD 30 MIN: CPT

## 2025-06-11 PROCEDURE — 99395 PREV VISIT EST AGE 18-39: CPT

## 2025-06-11 NOTE — PATIENT INSTRUCTIONS
"Patient Education     Routine physical for adults   The Basics   Written by the doctors and editors at [facility]   What is a physical? -- A physical is a routine visit, or \"check-up,\" with your doctor. You might also hear it called a \"wellness visit\" or \"preventive visit.\"  During each visit, the doctor will:   Ask about your physical and mental health   Ask about your habits, behaviors, and lifestyle   Do an exam   Give you vaccines if needed   Talk to you about any medicines you take   Give advice about your health   Answer your questions  Getting regular check-ups is an important part of taking care of your health. It can help your doctor find and treat any problems you have. But it's also important for preventing health problems.  A routine physical is different from a \"sick visit.\" A sick visit is when you see a doctor because of a health concern or problem. Since physicals are scheduled ahead of time, you can think about what you want to ask the doctor.  How often should I get a physical? -- It depends on your age and health. In general, for people age 21 years and older:   If you are younger than 50 years, you might be able to get a physical every 3 years.   If you are 50 years or older, your doctor might recommend a physical every year.  If you have an ongoing health condition, like diabetes or high blood pressure, your doctor will probably want to see you more often.  What happens during a physical? -- In general, each visit will include:   Physical exam - The doctor or nurse will check your height, weight, heart rate, and blood pressure. They will also look at your eyes and ears. They will ask about how you are feeling and whether you have any symptoms that bother you.   Medicines - It's a good idea to bring a list of all the medicines you take to each doctor visit. Your doctor will talk to you about your medicines and answer any questions. Tell them if you are having any side effects that bother you. You " "should also tell them if you are having trouble paying for any of your medicines.   Habits and behaviors - This includes:   Your diet   Your exercise habits   Whether you smoke, drink alcohol, or use drugs   Whether you are sexually active   Whether you feel safe at home  Your doctor will talk to you about things you can do to improve your health and lower your risk of health problems. They will also offer help and support. For example, if you want to quit smoking, they can give you advice and might prescribe medicines. If you want to improve your diet or get more physical activity, they can help you with this, too.   Lab tests, if needed - The tests you get will depend on your age and situation. For example, your doctor might want to check your:   Cholesterol   Blood sugar   Iron level   Vaccines - The recommended vaccines will depend on your age, health, and what vaccines you already had. Vaccines are very important because they can prevent certain serious or deadly infections.   Discussion of screening - \"Screening\" means checking for diseases or other health problems before they cause symptoms. Your doctor can recommend screening based on your age, risk, and preferences. This might include tests to check for:   Cancer, such as breast, prostate, cervical, ovarian, colorectal, prostate, lung, or skin cancer   Sexually transmitted infections, such as chlamydia and gonorrhea   Mental health conditions like depression and anxiety  Your doctor will talk to you about the different types of screening tests. They can help you decide which screenings to have. They can also explain what the results might mean.   Answering questions - The physical is a good time to ask the doctor or nurse questions about your health. If needed, they can refer you to other doctors or specialists, too.  Adults older than 65 years often need other care, too. As you get older, your doctor will talk to you about:   How to prevent falling at " home   Hearing or vision tests   Memory testing   How to take your medicines safely   Making sure that you have the help and support you need at home  All topics are updated as new evidence becomes available and our peer review process is complete.  This topic retrieved from [employer] on: May 02, 2024.  Topic 171415 Version 1.0  Release: 32.4.3 - C32.122  © 2024 UpToDate, Inc. and/or its affiliates. All rights reserved.  Consumer Information Use and Disclaimer   Disclaimer: This generalized information is a limited summary of diagnosis, treatment, and/or medication information. It is not meant to be comprehensive and should be used as a tool to help the user understand and/or assess potential diagnostic and treatment options. It does NOT include all information about conditions, treatments, medications, side effects, or risks that may apply to a specific patient. It is not intended to be medical advice or a substitute for the medical advice, diagnosis, or treatment of a health care provider based on the health care provider's examination and assessment of a patient's specific and unique circumstances. Patients must speak with a health care provider for complete information about their health, medical questions, and treatment options, including any risks or benefits regarding use of medications. This information does not endorse any treatments or medications as safe, effective, or approved for treating a specific patient. UpToDate, Inc. and its affiliates disclaim any warranty or liability relating to this information or the use thereof.The use of this information is governed by the Terms of Use, available at https://www.[employer].com/en/know/clinical-effectiveness-terms. 2024© UpToDate, Inc. and its affiliates and/or licensors. All rights reserved.  Copyright   © 2024 UpToDate, Inc. and/or its affiliates. All rights reserved.

## 2025-06-11 NOTE — PROGRESS NOTES
Adult Annual Physical  Name: Stewart Mar      : 2005      MRN: [id number]  Encounter Provider: Francoise London PA-C  Encounter Date: 2025   Encounter department: [facility] [address]    :  Assessment & Plan  Annual physical exam  -Pt due for annual physical        Anxiety with depression  -Cont Lexapro 10 mg   -Denies thoughts of hurting him self or anyone else   -Overall, PHQ and BISHNU screening are improving  -Declines referral to talk therapy          Acute non-recurrent pansinusitis  -Pt seen at  2025 and being treated with Ceftin 500 mg BID x 10 days and given cough syrup for night time  -Notes symptoms are overall improving with some lingering sinus pressure. Has not needed to take cough syrup yet today          Athlete's foot, left  -notes it is overall resolving with lotrimin cream       Skin tag  -Pt has skin tag on back he would like to get removed. Placed referral to dermatology and gave information for dedicated dermatology  -Pt also welcome to RTO for removal   Orders:    Ambulatory Referral to Dermatology; Future    Lightheadedness  -Pt had episode of lightheadedness at last visit 2025, see office visit ntoe  -ECHO was ordered at that time and he is getting this done tomorrow  -Notes he has overall been feeling better and denies episodes of lightheadedness/dizziness               Preventive Screenings:    - Prostate cancer screening: screening not indicated     Immunizations:  - Immunizations due: HPV (Gardasil 9)         History of Present Illness     Stewart presents to the office for follow up from urgent care visit 2025 and annual physical. He is going to rto for nurse visit for 3rd dose of HPV once he is feeling better and off ABX.        Adult Annual Physical:  Patient presents for annual physical.     Diet and Physical Activity:  - Diet/Nutrition: well balanced diet.  - Exercise: 5-7 times a week on average, strength training  "exercises and moderate cardiovascular exercise.    General Health:  - Sleep: sleeps well and 7-8 hours of sleep on average.  - Hearing: normal hearing bilateral ears.  - Vision: no vision problems and most recent eye exam < 1 year ago.  - Dental: regular dental visits, brushes teeth twice daily and floss regularly.     Health:  - History of STDs: no.   - Urinary symptoms: none.         Review of Systems   Constitutional:  Negative for chills, fatigue and fever.   HENT:  Positive for sinus pressure. Negative for congestion, ear pain, rhinorrhea, sinus pain and sore throat.    Eyes:  Negative for pain.   Respiratory:  Negative for cough and shortness of breath.    Cardiovascular:  Negative for chest pain and leg swelling.   Gastrointestinal:  Negative for abdominal pain, constipation, diarrhea, nausea and vomiting.   Genitourinary:  Negative for difficulty urinating, dysuria, frequency and urgency.   Musculoskeletal:  Negative for neck pain.   Skin:  Negative for rash.   Neurological:  Negative for dizziness and headaches.   Psychiatric/Behavioral:  Negative for sleep disturbance.          Objective   /80 (BP Location: Left arm, Patient Position: Sitting, Cuff Size: Standard)   Pulse 102   Temp 98.2 °F (36.8 °C) (Temporal)   Ht 6' 2\" (1.88 m)   Wt 72.4 kg (159 lb 9.6 oz)   SpO2 98%   BMI 20.49 kg/m²     Physical Exam  Vitals reviewed.   Constitutional:       General: He is not in acute distress.     Appearance: Normal appearance.   HENT:      Head: Normocephalic and atraumatic.      Right Ear: Tympanic membrane, ear canal and external ear normal.      Left Ear: Tympanic membrane, ear canal and external ear normal.      Nose: Congestion present.      Right Sinus: No maxillary sinus tenderness or frontal sinus tenderness.      Left Sinus: No maxillary sinus tenderness or frontal sinus tenderness.      Mouth/Throat:      Mouth: Mucous membranes are moist.      Pharynx: Oropharynx is clear. Postnasal drip " present. No posterior oropharyngeal erythema.     Eyes:      Extraocular Movements: Extraocular movements intact.      Conjunctiva/sclera: Conjunctivae normal.      Pupils: Pupils are equal, round, and reactive to light.       Cardiovascular:      Rate and Rhythm: Normal rate and regular rhythm.      Heart sounds: Normal heart sounds.   Pulmonary:      Effort: Pulmonary effort is normal.      Breath sounds: Normal breath sounds. No wheezing, rhonchi or rales.   Abdominal:      General: Bowel sounds are normal. There is no distension.      Palpations: Abdomen is soft.      Tenderness: There is no abdominal tenderness. There is no right CVA tenderness or left CVA tenderness.     Musculoskeletal:      Cervical back: Neck supple.      Right lower leg: No edema.      Left lower leg: No edema.   Lymphadenopathy:      Cervical: No cervical adenopathy.     Skin:     General: Skin is warm.      Capillary Refill: Capillary refill takes less than 2 seconds.      Findings: No rash.     Neurological:      Mental Status: He is alert. Mental status is at baseline.           Francoise London PA-C  Pending sale to [facility]  6/11/2025 3:42 PM

## 2025-06-30 ENCOUNTER — TELEPHONE (OUTPATIENT)
Age: 20
End: 2025-06-30

## 2025-07-01 ENCOUNTER — OFFICE VISIT (OUTPATIENT)
Dept: FAMILY MEDICINE CLINIC | Facility: CLINIC | Age: 20
End: 2025-07-01
Payer: COMMERCIAL

## 2025-07-01 VITALS
BODY MASS INDEX: 20.28 KG/M2 | OXYGEN SATURATION: 99 % | TEMPERATURE: 98.1 F | HEIGHT: 74 IN | WEIGHT: 158 LBS | HEART RATE: 90 BPM | SYSTOLIC BLOOD PRESSURE: 126 MMHG | DIASTOLIC BLOOD PRESSURE: 84 MMHG

## 2025-07-01 DIAGNOSIS — J30.2 SEASONAL ALLERGIES: ICD-10-CM

## 2025-07-01 DIAGNOSIS — B34.9 VIRAL ILLNESS: Primary | ICD-10-CM

## 2025-07-01 DIAGNOSIS — R53.83 OTHER FATIGUE: ICD-10-CM

## 2025-07-01 LAB
SARS-COV-2 AG UPPER RESP QL IA: NEGATIVE
VALID CONTROL: NORMAL

## 2025-07-01 PROCEDURE — 87811 SARS-COV-2 COVID19 W/OPTIC: CPT

## 2025-07-01 PROCEDURE — 99214 OFFICE O/P EST MOD 30 MIN: CPT

## 2025-07-01 RX ORDER — FEXOFENADINE HCL 180 MG/1
180 TABLET ORAL DAILY
Qty: 60 TABLET | Refills: 0 | Status: SHIPPED | OUTPATIENT
Start: 2025-07-01

## 2025-07-01 NOTE — PROGRESS NOTES
Name: Stewart Mar      : 2005      MRN: [id number]  Encounter Provider: Francoise London PA-C  Encounter Date: 2025   Encounter department: [facility] [address]  :  Assessment & Plan  Viral illness  -sweat/chills (did not take temp), congested lightheaded, sinus pressure, and right ear pain x 4 days  -Denies n/v/d. Did not take his temperature but has had on/off sweating  -Notes cough worse at night/morning, PND. Notes most of his symptoms are worse in the morning  -Recently traveled to [facility] and [place]. Just restarted Zyrtec  -pt has been on/off URI and OAM since 2025. He has been treated with doxycyline and ceftin. Symptoms have improved both times and then return   -Lungs CTA b/l, PND and erythematous throat, right TM erythematous.   -COVID negative in office  -Given ongoing on/off illness, recommend completing previously ordered labs (CBC, CMP, mono), will also order TSH and lyme to r/o additional causes of symptoms. Also discussed allergy symptoms, will switch from zyrtec to allegra and see if symptoms seem to improve.   -Ongoing sinus pressure/congestion, ear pain   if no improvement can consider referral to ENT     Orders:    POCT Rapid Covid Ag    Other fatigue    Orders:    Lyme Total AB W Reflex to IGM/IGG; Future    TSH + Free T4; Future    Seasonal allergies    Orders:    fexofenadine (ALLEGRA) 180 MG tablet; Take 1 tablet (180 mg total) by mouth daily           History of Present Illness     Earache   Associated symptoms include coughing. Pertinent negatives include no abdominal pain, diarrhea, headaches, neck pain, rash, rhinorrhea, sore throat or vomiting.       Stewart presents to the office for evaluation of ear ache. He  was treated 2025 at  with Ceftin. He notes earache originally improved, but now it has returned with congestion, fever, ear pain.     [place] felt better. He notes  "ongoing sore throat. This past Saturday, he was getting sweat/chills (did not take temp). Woke up this past Sunday lightheaded, sinus pressure, and right ear pain.     He has taken ibuprofen at home, Zyrtec. He has also been taking flonase. Has not noticed a significant difference. It always feels worse in morning. Notes an increase in fatigue.        Review of Systems   Constitutional:  Positive for fever. Negative for chills and fatigue.   HENT:  Positive for congestion, ear pain, postnasal drip and sinus pressure. Negative for rhinorrhea, sinus pain and sore throat.    Eyes:  Negative for pain.   Respiratory:  Positive for cough. Negative for shortness of breath and wheezing.    Cardiovascular:  Negative for chest pain and leg swelling.   Gastrointestinal:  Negative for abdominal pain, constipation, diarrhea, nausea and vomiting.   Genitourinary:  Negative for difficulty urinating, dysuria, frequency and urgency.   Musculoskeletal:  Negative for neck pain.   Skin:  Negative for rash.   Neurological:  Negative for dizziness and headaches.   Psychiatric/Behavioral:  Negative for sleep disturbance.        Objective   /84 (BP Location: Left arm, Patient Position: Sitting, Cuff Size: Standard)   Pulse 90   Temp 98.1 °F (36.7 °C) (Temporal)   Ht 6' 2\" (1.88 m)   Wt 71.7 kg (158 lb)   SpO2 99%   BMI 20.29 kg/m²      Physical Exam  Vitals reviewed.   Constitutional:       General: He is not in acute distress.     Appearance: Normal appearance.   HENT:      Head: Normocephalic and atraumatic.      Right Ear: External ear normal. Tympanic membrane is erythematous. Tympanic membrane is not retracted or bulging.      Left Ear: External ear normal. There is impacted cerumen.      Nose: Congestion present.      Right Sinus: Maxillary sinus tenderness present. No frontal sinus tenderness.      Left Sinus: Maxillary sinus tenderness present. No frontal sinus tenderness.      Mouth/Throat:      Mouth: Mucous membranes " are moist.      Pharynx: Uvula midline. Posterior oropharyngeal erythema and postnasal drip present.     Eyes:      Extraocular Movements: Extraocular movements intact.      Conjunctiva/sclera: Conjunctivae normal.      Pupils: Pupils are equal, round, and reactive to light.       Cardiovascular:      Rate and Rhythm: Normal rate and regular rhythm.      Heart sounds: Normal heart sounds.   Pulmonary:      Effort: Pulmonary effort is normal.      Breath sounds: Normal breath sounds. No wheezing, rhonchi or rales.   Abdominal:      General: Bowel sounds are normal. There is no distension.      Palpations: Abdomen is soft.      Tenderness: There is no abdominal tenderness. There is no right CVA tenderness or left CVA tenderness.     Musculoskeletal:      Cervical back: Neck supple.      Right lower leg: No edema.      Left lower leg: No edema.   Lymphadenopathy:      Cervical: No cervical adenopathy.     Skin:     General: Skin is warm.      Capillary Refill: Capillary refill takes less than 2 seconds.      Findings: No rash.     Neurological:      Mental Status: He is alert. Mental status is at baseline.           Francoise London PA-C  [facility]  7/1/2025 2:04 PM

## 2025-07-02 ENCOUNTER — TELEPHONE (OUTPATIENT)
Age: 20
End: 2025-07-02

## 2025-07-02 DIAGNOSIS — J32.9 RECURRENT SINUSITIS: Primary | ICD-10-CM

## 2025-07-02 DIAGNOSIS — H65.194 OTHER RECURRENT ACUTE NONSUPPURATIVE OTITIS MEDIA OF RIGHT EAR: ICD-10-CM

## 2025-07-02 NOTE — TELEPHONE ENCOUNTER
Patient mom calling in stating they were in yesterday regarding his ears and today his ears are worse. They discussed briefly about an ent and they want to know who the provider would recommend ent wise for patient to see

## 2025-07-03 ENCOUNTER — TELEPHONE (OUTPATIENT)
Age: 20
End: 2025-07-03

## 2025-07-03 DIAGNOSIS — H66.90 ACUTE OTITIS MEDIA, UNSPECIFIED OTITIS MEDIA TYPE: Primary | ICD-10-CM

## 2025-07-03 RX ORDER — CEFUROXIME AXETIL 500 MG/1
500 TABLET ORAL EVERY 12 HOURS SCHEDULED
Qty: 14 TABLET | Refills: 0 | Status: SHIPPED | OUTPATIENT
Start: 2025-07-03 | End: 2025-07-10

## 2025-07-03 NOTE — TELEPHONE ENCOUNTER
Ceftin 500 mg BID x 7 days called in for pt. Hx of PCN allergy, he tolerated this medication 6/2025. Any signs/symptoms of allergic reaction pt should dc medication and report to ED. Recommend taking with probiotic.    Francoise London PA-C  [facility]  7/3/2025 2:59 PM

## 2025-07-03 NOTE — TELEPHONE ENCOUNTER
Patient was seen on 7/1/2025   Patients mom Allison calling back, requesting if Francoise can call in an antibiotic

## 2025-07-04 LAB
ALBUMIN SERPL-MCNC: 4.2 G/DL (ref 3.6–5.1)
ALBUMIN/GLOB SERPL: 1.6 (CALC) (ref 1–2.5)
ALP SERPL-CCNC: 58 U/L (ref 36–130)
ALT SERPL-CCNC: 34 U/L (ref 9–46)
AST SERPL-CCNC: 39 U/L (ref 10–40)
BASOPHILS # BLD AUTO: 52 CELLS/UL (ref 0–200)
BASOPHILS NFR BLD AUTO: 1 %
BILIRUB SERPL-MCNC: 0.8 MG/DL (ref 0.2–1.2)
BUN SERPL-MCNC: 14 MG/DL (ref 7–25)
BUN/CREAT SERPL: NORMAL (CALC) (ref 6–22)
CALCIUM SERPL-MCNC: 9.4 MG/DL (ref 8.6–10.3)
CHLORIDE SERPL-SCNC: 101 MMOL/L (ref 98–110)
CHOLEST SERPL-MCNC: 133 MG/DL
CHOLEST/HDLC SERPL: 3.3 (CALC)
CO2 SERPL-SCNC: 28 MMOL/L (ref 20–32)
CREAT SERPL-MCNC: 0.91 MG/DL (ref 0.6–1.24)
EOSINOPHIL # BLD AUTO: 104 CELLS/UL (ref 15–500)
EOSINOPHIL NFR BLD AUTO: 2 %
ERYTHROCYTE [DISTWIDTH] IN BLOOD BY AUTOMATED COUNT: 12 % (ref 11–15)
GFR/BSA.PRED SERPLBLD CYS-BASED-ARV: 123 ML/MIN/1.73M2
GLOBULIN SER CALC-MCNC: 2.7 G/DL (CALC) (ref 1.9–3.7)
GLUCOSE SERPL-MCNC: 81 MG/DL (ref 65–99)
HCT VFR BLD AUTO: 43.2 % (ref 38.5–50)
HDLC SERPL-MCNC: 40 MG/DL
HGB BLD-MCNC: 14.6 G/DL (ref 13.2–17.1)
LDLC SERPL CALC-MCNC: 78 MG/DL (CALC)
LYMPHOCYTES # BLD MANUAL: 1872 CELLS/UL (ref 850–3900)
LYMPHOCYTES NFR BLD AUTO: 36 %
MCH RBC QN AUTO: 29.9 PG (ref 27–33)
MCHC RBC AUTO-ENTMCNC: 33.8 G/DL (ref 32–36)
MCV RBC AUTO: 88.5 FL (ref 80–100)
MONOCYTES # BLD AUTO: 416 CELLS/UL (ref 200–950)
MONOCYTES NFR BLD AUTO: 8 %
NEUTROPHILS # BLD AUTO: 1924 CELLS/UL (ref 1500–7800)
NEUTROPHILS NFR BLD AUTO: 37 %
NEUTS BAND # BLD: 208 CELLS/UL (ref 0–750)
NEUTS BAND NFR BLD MANUAL: 4 %
NONHDLC SERPL-MCNC: 93 MG/DL (CALC)
PLATELET # BLD AUTO: 160 THOUSAND/UL (ref 140–400)
PMV BLD REES-ECKER: 10.4 FL (ref 7.5–12.5)
POTASSIUM SERPL-SCNC: 4.5 MMOL/L (ref 3.5–5.3)
PROT SERPL-MCNC: 6.9 G/DL (ref 6.1–8.1)
RBC # BLD AUTO: 4.88 MILLION/UL (ref 4.2–5.8)
SODIUM SERPL-SCNC: 138 MMOL/L (ref 135–146)
TRIGL SERPL-MCNC: 71 MG/DL
VARIANT LYMPHS NFR BLD: 12 % (ref 0–10)
VARIANT LYMPHS NFR BLD: 624 CELLS/UL
WBC # BLD AUTO: 5.2 THOUSAND/UL (ref 3.8–10.8)

## 2025-07-07 LAB
ALBUMIN SERPL-MCNC: 4.2 G/DL (ref 3.6–5.1)
ALBUMIN/GLOB SERPL: 1.6 (CALC) (ref 1–2.5)
ALP SERPL-CCNC: 58 U/L (ref 36–130)
ALT SERPL-CCNC: 34 U/L (ref 9–46)
AST SERPL-CCNC: 39 U/L (ref 10–40)
B BURGDOR IGG+IGM SER QL IA: <=0.9 INDEX
BASOPHILS # BLD AUTO: 52 CELLS/UL (ref 0–200)
BASOPHILS NFR BLD AUTO: 1 %
BILIRUB SERPL-MCNC: 0.8 MG/DL (ref 0.2–1.2)
BUN SERPL-MCNC: 14 MG/DL (ref 7–25)
BUN/CREAT SERPL: NORMAL (CALC) (ref 6–22)
CALCIUM SERPL-MCNC: 9.4 MG/DL (ref 8.6–10.3)
CHLORIDE SERPL-SCNC: 101 MMOL/L (ref 98–110)
CHOLEST SERPL-MCNC: 133 MG/DL
CHOLEST/HDLC SERPL: 3.3 (CALC)
CO2 SERPL-SCNC: 28 MMOL/L (ref 20–32)
CREAT SERPL-MCNC: 0.91 MG/DL (ref 0.6–1.24)
EOSINOPHIL # BLD AUTO: 104 CELLS/UL (ref 15–500)
EOSINOPHIL NFR BLD AUTO: 2 %
ERYTHROCYTE [DISTWIDTH] IN BLOOD BY AUTOMATED COUNT: 12 % (ref 11–15)
GFR/BSA.PRED SERPLBLD CYS-BASED-ARV: 123 ML/MIN/1.73M2
GLOBULIN SER CALC-MCNC: 2.7 G/DL (CALC) (ref 1.9–3.7)
GLUCOSE SERPL-MCNC: 81 MG/DL (ref 65–99)
HCT VFR BLD AUTO: 43.2 % (ref 38.5–50)
HDLC SERPL-MCNC: 40 MG/DL
HETEROPH AB SER QL LA: NEGATIVE
HGB BLD-MCNC: 14.6 G/DL (ref 13.2–17.1)
LDLC SERPL CALC-MCNC: 78 MG/DL (CALC)
LYMPHOCYTES # BLD MANUAL: 1872 CELLS/UL (ref 850–3900)
LYMPHOCYTES NFR BLD AUTO: 36 %
MCH RBC QN AUTO: 29.9 PG (ref 27–33)
MCHC RBC AUTO-ENTMCNC: 33.8 G/DL (ref 32–36)
MCV RBC AUTO: 88.5 FL (ref 80–100)
MONOCYTES # BLD AUTO: 416 CELLS/UL (ref 200–950)
MONOCYTES NFR BLD AUTO: 8 %
NEUTROPHILS # BLD AUTO: 1924 CELLS/UL (ref 1500–7800)
NEUTROPHILS NFR BLD AUTO: 37 %
NEUTS BAND # BLD: 208 CELLS/UL (ref 0–750)
NEUTS BAND NFR BLD MANUAL: 4 %
NONHDLC SERPL-MCNC: 93 MG/DL (CALC)
PLATELET # BLD AUTO: 160 THOUSAND/UL (ref 140–400)
PMV BLD REES-ECKER: 10.4 FL (ref 7.5–12.5)
POTASSIUM SERPL-SCNC: 4.5 MMOL/L (ref 3.5–5.3)
PROT SERPL-MCNC: 6.9 G/DL (ref 6.1–8.1)
RBC # BLD AUTO: 4.88 MILLION/UL (ref 4.2–5.8)
SODIUM SERPL-SCNC: 138 MMOL/L (ref 135–146)
TRIGL SERPL-MCNC: 71 MG/DL
VARIANT LYMPHS NFR BLD: 12 % (ref 0–10)
VARIANT LYMPHS NFR BLD: 624 CELLS/UL
WBC # BLD AUTO: 5.2 THOUSAND/UL (ref 3.8–10.8)

## 2025-07-10 ENCOUNTER — CLINICAL SUPPORT (OUTPATIENT)
Dept: FAMILY MEDICINE CLINIC | Facility: CLINIC | Age: 20
End: 2025-07-10
Payer: COMMERCIAL

## 2025-07-10 DIAGNOSIS — Z23 ENCOUNTER FOR IMMUNIZATION: Primary | ICD-10-CM

## 2025-07-10 PROCEDURE — 90471 IMMUNIZATION ADMIN: CPT

## 2025-07-10 PROCEDURE — 90651 9VHPV VACCINE 2/3 DOSE IM: CPT

## 2025-07-10 NOTE — PROGRESS NOTES
Patient comes to the clinic today for his 3rd dose of HPV. Was given in the Right Deltoid. Tolerated well.